# Patient Record
Sex: FEMALE | Race: WHITE | NOT HISPANIC OR LATINO | Employment: FULL TIME | URBAN - METROPOLITAN AREA
[De-identification: names, ages, dates, MRNs, and addresses within clinical notes are randomized per-mention and may not be internally consistent; named-entity substitution may affect disease eponyms.]

---

## 2017-07-07 ENCOUNTER — APPOINTMENT (OUTPATIENT)
Dept: RADIOLOGY | Facility: IMAGING CENTER | Age: 72
End: 2017-07-07
Attending: NURSE PRACTITIONER
Payer: COMMERCIAL

## 2017-07-07 ENCOUNTER — OCCUPATIONAL MEDICINE (OUTPATIENT)
Dept: URGENT CARE | Facility: PHYSICIAN GROUP | Age: 72
End: 2017-07-07
Payer: COMMERCIAL

## 2017-07-07 ENCOUNTER — APPOINTMENT (OUTPATIENT)
Dept: URGENT CARE | Facility: PHYSICIAN GROUP | Age: 72
End: 2017-07-07
Payer: MEDICARE

## 2017-07-07 VITALS
OXYGEN SATURATION: 98 % | DIASTOLIC BLOOD PRESSURE: 86 MMHG | WEIGHT: 144.8 LBS | RESPIRATION RATE: 14 BRPM | BODY MASS INDEX: 28.28 KG/M2 | TEMPERATURE: 98.2 F | HEART RATE: 69 BPM | SYSTOLIC BLOOD PRESSURE: 152 MMHG

## 2017-07-07 DIAGNOSIS — S86.912A STRAIN OF LEFT KNEE, INITIAL ENCOUNTER: ICD-10-CM

## 2017-07-07 PROCEDURE — 73564 X-RAY EXAM KNEE 4 OR MORE: CPT | Mod: 26,LT,29 | Performed by: NURSE PRACTITIONER

## 2017-07-07 PROCEDURE — 99214 OFFICE O/P EST MOD 30 MIN: CPT | Mod: 29 | Performed by: NURSE PRACTITIONER

## 2017-07-07 ASSESSMENT — ENCOUNTER SYMPTOMS
CHILLS: 0
FOCAL WEAKNESS: 0
NAUSEA: 0
MYALGIAS: 0
NECK PAIN: 0
BACK PAIN: 0
FEVER: 0
TINGLING: 0
SENSORY CHANGE: 0
VOMITING: 0

## 2017-07-07 NOTE — Clinical Note
[facility]   [address] [place] [address]. [address] - [place] [address]  Phone: [phone] - Fax: [phone]        [facility] Progress Report and Disability Certification  Date of Service: 7/7/2017   No Show:  No  Date / Time of Next Visit: 7/11/2017@10:40 AM   Claim Information   Patient Name: Sandie Moreno  Claim Number:     Employer: [employer]  Date of Injury: 6/28/2017     Insurer / TPA: [employer]  ID / SSN:     Occupation: Terri  Diagnosis: The encounter diagnosis was Strain of left knee, initial encounter.    Medical Information   Related to Industrial Injury? Yes    Subjective Complaints:  DOI  6/28/17 pt states she was climbing a ladder at work with onset of left knee pain.  The pain is 8/10 with standing, walking and sitting. She can feel popping in her knee with certain movement.  She has been wrapping with an ace wrap and taking nsaids with no improvement. The patient denies any previous knee injuries or other current employment. Pt was seen at [facility] and was told it was arthritis. The pain became worse working last night the patient came here for re-evaluation.    Objective Findings: The patient is awake alert no acute distress. Her left knee is swollen. There is no erythema, no ecchymosis, no increased warmth. She has good flexion and extension of the knee. There is no increased pain to palpation. CSM distally was intact. There is no laxity of the ligaments.   Pre-Existing Condition(s): Patient denies any previous knee injuries   Assessment:   Initial Visit    Status: Additional Care Required  Permanent Disability:No    Plan: Medication    Diagnostics: X-ray  Comments:negative knee x-ray    Comments:  Rest, ice, Ace wrap, Advil, follow-up with occupational medicine    Disability Information   Status: Released to Restricted Duty    From:  7/7/2017  Through: 7/12/2017 Restrictions are: Temporary  Comments:follow up with  occupational medicine   Physical Restrictions   Sitting:  < or = to 1 hr/day Standing:  < or = to 4 hrs/day Stooping:  < or = to 4 hrs/day Bending:      Squattin hrs/day Walking:  < or = to 4 hrs/day Climbing:  < or = to 4 hrs/day Pushing:      Pulling:    Other:    Reaching Above Shoulder (L):   Reaching Above Shoulder (R):       Reaching Below Shoulder (L):    Reaching Below Shoulder (R):      Not to exceed Weight Limits   Carrying(hrs):   Weight Limit(lb): < or = to 25 pounds Lifting(hrs):   Weight  Limit(lb): < or = to 25 pounds   Comments:      Repetitive Actions   Hands: i.e. Fine Manipulations from Grasping:     Feet: i.e. Operating Foot Controls:     Driving / Operate Machinery:     Physician Name: DIXIE Patricia Physician Signature: KYLAH Wilkinson e-Signature: Dr. Asa Stephen, Medical Director   Clinic Name / Location: [address]  [place] [address] Clinic Phone Number: Dept: [phone]   Appointment Time: 9:25 Am Visit Start Time: 9:37 AM   Check-In Time:  9:30 Am Visit Discharge Time: 11:42 AM   Original-Treating Physician or Chiropractor    Page 2-Insurer/TPA    Page 3-Employer    Page 4-Employee

## 2017-07-07 NOTE — Clinical Note
EMPLOYEE’S CLAIM FOR COMPENSATION/ REPORT OF INITIAL TREATMENT  FORM C-4    EMPLOYEE’S CLAIM - PROVIDE ALL INFORMATION REQUESTED   First Name  Sandei Last Name  Josh Birthdate                    1945                Sex  female Claim Number   Home Address  [address] Age  72 y.o. 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 [facility] [address]  [place]  Zip  [address]    Date of Injury  6/28/2017               Hour of Injury  11:00 PM Date Employer Notified  6/28/2017 Last Day of Work after Injury or Occupational Disease  7/4/2017 Supervisor to Whom Injury Reported  [facility]   Address or Location of Accident (if applicable)  [[address]]   What were you doing at the time of accident? (if applicable)  Climbing a ladder    How did this injury or occupational disease occur? (Be specific an answer in detail. Use additional sheet if necessary)  twisted knee   If you believe that you have an occupational disease, when did you first have knowledge of the disability and it relationship to your employment?  NA Witnesses to the Accident  None      Nature of Injury or Occupational Disease  Workers' Compensation  Part(s) of Body Injured or Affected  Knee (L), ,     I certify that the above is true and correct to the best of my knowledge and that I have provided this information in order to obtain the benefits of [place]  inclusive or Chapter [place] of [place]).  I hereby authorize any physician, chiropractor, surgeon, practitioner, or other person, any hospital, including [facility] or [facility] hospital, any medical service organization, any insurance company, or other institution or organization to release to each other, any medical or other information, including benefits paid or payable, pertinent to this injury or disease, except information relative to diagnosis, treatment and/or counseling for AIDS, psychological conditions, alcohol or controlled substances, for which I must give specific authorization.  A Photostat of this authorization shall be as valid as the original.     Date   Place   Employee’s Signature   THIS REPORT MUST BE COMPLETED AND MAILED WITHIN 3 WORKING DAYS OF TREATMENT   Place  [facility]  Name of Facility  [place]   Date  7/7/2017 Diagnosis  (S86.912A) Strain of left knee, initial encounter Is there evidence the injured employee was under the influence of alcohol and/or another controlled substance at the time of accident?   Hour  9:37 AM Description of Injury or Disease  The encounter diagnosis was Strain of left knee, initial encounter. No   Treatment  Rest, ice, ace wrap and advil, follow-up with occupational medicine.  Have you advised the patient to remain off work five days or more? No   X-Ray Findings  Negative   If Yes   From Date  To Date      From information given by the employee, together with medical evidence, can you directly connect this injury or occupational disease as job incurred?  Yes If No Full Duty  No Modified Duty  Yes   Is additional medical care by a physician indicated?  Yes If Modified Duty, Specify any Limitations / Restrictions  Sitting less than one hour day, standing, stooping, walking, climbing less than 4 hours a day. No lifting or carrying more than 25 pounds   Do you know of any previous injury or disease contributing to this condition or  "occupational disease?                            No   Date  7/7/2017 Print Doctor’s Name DIXIE Patricia I certify the employer’s copy of  this form was mailed on:   Address  [address] [place] [address]. [address] Insurer’s Use Only     [facility] Zip  [address]    Provider’s Tax ID Number  [id number]  Telephone  Dept: [phone]        e-SignMORRKYLAH ANDERSON   e-Signature: Dr. Asa Stephen, Medical Director Degree  APN        ORIGINAL-TREATING PHYSICIAN OR CHIROPRACTOR    PAGE 2-INSURER/TPA    PAGE 3-EMPLOYER    PAGE 4-EMPLOYEE             Form C-4 (rev10/07)              BRIEF DESCRIPTION OF RIGHTS AND BENEFITS  (Pursuant to [place]RS 616C.050)    Notice of Injury or Occupational Disease (Incident Report Form C-1): If an injury or occupational disease (OD) arises out of and in the  course of employment, you must provide written notice to your employer as soon as practicable, but no later than 7 days after the accident or  OD. Your employer shall maintain a sufficient supply of the required forms.    Claim for Compensation (Form C-4): If medical treatment is sought, the form C-4 is available at the place of initial treatment. A completed  \"Claim for Compensation\" (Form C-4) must be filed within 90 days after an accident or OD. The treating physician or chiropractor must,  within 3 working days after treatment, complete and mail to the employer, the employer's insurer and third-party , the Claim for  Compensation.    Medical Treatment: If you require medical treatment for your on-the-job injury or OD, you may be required to select a physician or  chiropractor from a list provided by your workers’ compensation insurer, if it has contracted with an Organization for Managed Care (MCO) or  Preferred Provider Organization (PPO) or providers of health care. If your employer has not entered into a contract with an MCO or PPO, you  may select a physician or chiropractor from the Panel of " Physicians and Chiropractors. Any medical costs related to your industrial injury or  OD will be paid by your insurer.    Temporary Total Disability (TTD): If your doctor has certified that you are unable to work for a period of at least 5 consecutive days, or 5  cumulative days in a 20-day period, or places restrictions on you that your employer does not accommodate, you may be entitled to TTD  compensation.    Temporary Partial Disability (TPD): If the wage you receive upon reemployment is less than the compensation for TTD to which you are  entitled, the insurer may be required to pay you TPD compensation to make up the difference. TPD can only be paid for a maximum of 24  months.    Permanent Partial Disability (PPD): When your medical condition is stable and there is an indication of a PPD as a result of your injury or  OD, within 30 days, your insurer must arrange for an evaluation by a rating physician or chiropractor to determine the degree of your PPD. The  amount of your PPD award depends on the date of injury, the results of the PPD evaluation and your age and wage.    Permanent Total Disability (PTD): If you are medically certified by a treating physician or chiropractor as permanently and totally disabled  and have been granted a PTD status by your insurer, you are entitled to receive monthly benefits not to exceed 66 2/3% of your average  monthly wage. The amount of your PTD payments is subject to reduction if you previously received a PPD award.    Vocational Rehabilitation Services: You may be eligible for vocational rehabilitation services if you are unable to return to the job due to a  permanent physical impairment or permanent restrictions as a result of your injury or occupational disease.    Transportation and Per [place] Reimbursement: You may be eligible for travel expenses and per chadwick associated with medical treatment.    Reopening: You may be able to reopen your claim if your condition  worsens after claim closure.    Appeal Process: If you disagree with a written determination issued by the insurer or the insurer does not respond to your request, you may  appeal to the Department of Administration, , by following the instructions contained in your determination letter. You must  appeal the determination within 70 days from the date of the determination letter at [address], [address], [place], [place]  [address], or [address], [place], [place] [address]. If you disagree with the  decision, you may appeal to the  Department of Administration, . You must file your appeal within 30 days from the date of the  decision  letter at [address], [address], [place], [place] [address], or [address] [facility] [address], [place], [place] [address]. If you  disagree with a decision of an , you may file a petition for judicial review with the District Court. You must do so within 30  days of the Appeal Officer’s decision. You may be represented by an  at your own expense or you may contact the [facility] for possible  representation.    [place]  for Injured Workers (NAIW): If you disagree with a  decision, you may request that NAIW represent you  without charge at an  Hearing. For information regarding denial of benefits, you may contact the [facility] at: [address], [place] [address], [phone], or [address], [place] [address], [phone]    To File a Complaint with the Division: If you wish to file a complaint with the  of the Division of Industrial Relations (DIR),  please contact the Workers’ Compensation Section, [address] [place], [facility] [facility] [address] [facility] [address], [place], [place] [address], telephone [phone], or  [address], [place] [address], telephone   [phone].    For assistance with Workers’ Compensation Issues: you may contact the Office of the Governor Consumer Health Assistance, [address] [place] [address], [address], [place] [address], Toll Free 1-935.633.8475, Web site: http://govcha.[facility].nv., E-mail  [email or web address].                                                                                                                                                                                                                                   __________________________________________________________________                                                                   _________________                Employee Name / Signature                                                                                                                                                       Date                                                                                                                                                                                                     D-2 (rev. 10/07)

## 2017-07-07 NOTE — MR AVS SNAPSHOT
Sandie Rodriguezil   2017 9:25 AM   Occupational Medicine   MRN: [id number]    Department:  [facility]   Dept Phone:  [phone]    Description:  Female : 1945   Provider:  DIXIE Patricia           Reason for Visit     Knee Pain (left)       Allergies as of 2017     Allergen Noted Reactions    Vicodin [Hydrocodone-Acetaminophen] 2010   Swelling    Antihistamine [Altaryl] 2009         You were diagnosed with     Strain of left knee, initial encounter   [[id number]]         Vital Signs     Blood Pressure Pulse Temperature Respirations Weight Oxygen Saturation    152/86 mmHg 69 36.8 °C (98.2 °F) 14 65.681 kg (144 lb 12.8 oz) 98%    Smoking Status                   Never Smoker            Basic Information     Date Of Birth Sex Race Ethnicity Preferred Language    1945 Female White Non- English      Your appointments     2017 10:40 AM   Workers Compensation (Long) with Juarez Tripathi M.D.   [address] [address]   [phone]              Problem List              ICD-10-CM Priority Class Noted - Resolved    Thyroid disease E07.9   Unknown - Present      Health Maintenance        Date Due Completion Dates    PAP SMEAR 1966 ---    IMM ZOSTER VACCINE 2005 ---    IMM PNEUMOCOCCAL 65+ (ADULT) LOW/MEDIUM RISK SERIES (1 of 2 - PCV13) 2010 ---    MAMMOGRAM 2016 (Declined), 2009, 2009, 2009    Override on 2015: Patient Declined    COLONOSCOPY 10/23/2016 10/23/2006 (Prv Comp)    Override on 10/23/2006: Previously completed    IMM INFLUENZA (1) 2017 ---    BONE DENSITY 2020    IMM DTaP/Tdap/Td Vaccine (3 - Td) 2024, 2009            Current Immunizations     Tdap Vaccine 2014  6:36 PM, 2009      Below and/or attached are the medications your provider expects you to take. Review all of your home  medications and newly ordered medications with your provider and/or pharmacist. Follow medication instructions as directed by your provider and/or pharmacist. Please keep your medication list with you and share with your provider. Update the information when medications are discontinued, doses are changed, or new medications (including over-the-counter products) are added; and carry medication information at all times in the event of emergency situations     Allergies:  VICODIN - Swelling     ANTIHISTAMINE - (reactions not documented)               Medications  Valid as of: July 07, 2017 - 12:16 PM    Generic Name Brand Name Tablet Size Instructions for use    Levothyroxine Sodium (Tab) SYNTHROID 137 MCG Take  by mouth every day.        Multiple Vitamin (Tab) Multiple Vitamins  Take  by mouth. daily         .                 Medicines prescribed today were sent to:     [id number] - [place] - [address]    [address] [place] [address]    Phone: [phone] Fax: [phone]    Open 24 Hours?: No      Medication refill instructions:       If your prescription bottle indicates you have medication refills left, it is not necessary to call your provider’s office. Please contact your pharmacy and they will refill your medication.    If your prescription bottle indicates you do not have any refills left, you may request refills at any time through one of the following ways: The online [employer] system (except Urgent Care), by calling your provider’s office, or by asking your pharmacy to contact your provider’s office with a refill request. Medication refills are processed only during regular business hours and may not be available until the next business day. Your provider may request additional information or to have a follow-up visit with you prior to refilling your medication.   *Please Note: Medication refills are assigned a new Rx number when refilled electronically. Your pharmacy may indicate that no  refills were authorized even though a new prescription for the same medication is available at the pharmacy. Please request the medicine by name with the pharmacy before contacting your provider for a refill.        Your To Do List     Future Labs/Procedures Complete By Expires    DX-KNEE COMPLETE 4+ LEFT  As directed 7/7/2018         S[employer] Access Code: [email or web address]  Expires: 8/6/2017 12:16 PM    [employer]  A secure, online tool to manage your health information     [employer]’s [employer] Nextel® is a secure, online tool that connects you to your personalized health information from the privacy of your home -- day or night - making it very easy for you to manage your healthcare. Once the activation process is completed, you can even access your medical information using the Sprint Nextel gus, which is available for free in the Apple Gus store or Google Play store.     Sprint Nextel provides the following levels of access (as shown below):   My Chart Features   [facility] Doctor [facility]  Specialists [facility]  Urgent  [facility]  Primary Care  Doctor   Email your healthcare team securely and privately 24/7 X X X    Manage appointments: schedule your next appointment; view details of past/upcoming appointments X      Request prescription refills. X      View recent personal medical records, including lab and immunizations X X X X   View health record, including health history, allergies, medications X X X X   Read reports about your outpatient visits, procedures, consult and ER notes X X X X   See your discharge summary, which is a recap of your hospital and/or ER visit that includes your diagnosis, lab results, and care plan. X X       How to register for [employer] Nextel:  1. Go to  [email or web address].  2. Click on the Sign Up Now box, which takes you to the New Member Sign Up page. You will need to provide the following information:  a. Enter your [employer] Nextel Access Code exactly as it appears at the top of this page. (You  will not need to use this code after you’ve completed the sign-up process. If you do not sign up before the expiration date, you must request a new code.)   b. Enter your date of birth.   c. Enter your home email address.   d. Click Submit, and follow the next screen’s instructions.  3. Create a [employer] ID. This will be your [employer] login ID and cannot be changed, so think of one that is secure and easy to remember.  4. Create a [employer] password. You can change your password at any time.  5. Enter your Password Reset Question and Answer. This can be used at a later time if you forget your password.   6. Enter your e-mail address. This allows you to receive e-mail notifications when new information is available in [employer].  7. Click Sign Up. You can now view your health information.    For assistance activating your [employer] account, call [phone]

## 2017-07-08 NOTE — PROGRESS NOTES
Subjective:      Sandie Moreno is a 72 y.o. female who presents with Knee Pain      DOI  6/28/17 pt states she was climbing a ladder at work with onset of left knee pain.  The pain is 8/10 with standing, walking and sitting. She can feel popping in her knee with certain movement.  She has been wrapping with an ace wrap and taking nsaids with no improvement. The patient denies any previous knee injuries or other current employment. Pt was seen at [facility] and was told it was arthritis. The pain became worse working last night the patient came here for re-evaluation.      HPI Comments: Medications, Allergies and Prior Medical Hx reviewed and updated in [facility].with patient/family today           Review of Systems   Constitutional: Negative for fever, chills and malaise/fatigue.   Gastrointestinal: Negative for nausea and vomiting.   Musculoskeletal: Positive for joint pain. Negative for myalgias, back pain and neck pain.   Skin: Negative for rash.   Neurological: Negative for tingling, sensory change and focal weakness.          Objective:     /86 mmHg  Pulse 69  Temp(Src) 36.8 °C (98.2 °F)  Resp 14  Wt 65.681 kg (144 lb 12.8 oz)  SpO2 98%     Physical Exam   Constitutional: She appears well-developed and well-nourished. No distress.   HENT:   Head: Normocephalic and atraumatic.   Eyes: Conjunctivae are normal. Pupils are equal, round, and reactive to light.   Neck: Neck supple.   Cardiovascular: Normal rate.    Pulmonary/Chest: Effort normal. No respiratory distress.   Neurological: She is alert.   Awake, alert, answering questions appropriately, moving all extremeties   Skin: Skin is warm and dry. No rash noted.   Psychiatric: She has a normal mood and affect. Her behavior is normal.   Vitals reviewed.      The patient is awake alert no acute distress. Her left knee is swollen. There is no erythema, no ecchymosis, no increased warmth. She has good flexion and extension of the knee. There is no increased  pain to palpation. CSM distally was intact. There is no laxity of the ligaments.       Assessment/Plan:       1. Strain of left knee, initial encounter  DX-KNEE COMPLETE 4+ LEFT       Xray of left knee -  Reviewed film and radiology interpretation:   Negative LEFT knee series.    Work restrictions as documented on D 39 form  Pt will go to the ER for worsening or changing symptoms as discussed, follow-up at [facility] Occupational Medicine  Discharge instructions discussed with pt/family who verbalize understanding and agreement with poc

## 2017-11-28 ENCOUNTER — OFFICE VISIT (OUTPATIENT)
Dept: URGENT CARE | Facility: PHYSICIAN GROUP | Age: 72
End: 2017-11-28
Payer: MEDICARE

## 2017-11-28 VITALS
HEART RATE: 85 BPM | BODY MASS INDEX: 28.47 KG/M2 | HEIGHT: 60 IN | OXYGEN SATURATION: 96 % | SYSTOLIC BLOOD PRESSURE: 132 MMHG | DIASTOLIC BLOOD PRESSURE: 60 MMHG | WEIGHT: 145 LBS | TEMPERATURE: 97.8 F

## 2017-11-28 DIAGNOSIS — H61.22 IMPACTED CERUMEN OF LEFT EAR: ICD-10-CM

## 2017-11-28 PROCEDURE — 99213 OFFICE O/P EST LOW 20 MIN: CPT | Performed by: PHYSICIAN ASSISTANT

## 2017-11-28 NOTE — PROGRESS NOTES
Subjective:      Sandie Moreno is a 72 y.o. female who presents with Ear Fullness (Lt ear unable to hear - onset yesterday when pt cleaned out her ears )            HPI  Chief Complaint   Patient presents with   • Ear Fullness     Lt ear unable to hear - onset yesterday when pt cleaned out her ears        HPI:  Sandie Moreno is a 72 y.o. female who presents with left ear fullness since yesterday.  Was trying to clean her ears out with a Q-tip and had hearinling loss since then.  No pain.  Patient denies HA, SOB, chest pain, palpitations, fever, chills, or n/v/d.      Past Medical History:   Diagnosis Date   • Breast cancer (CMS-HCC)    • Thyroid disease hypothyroid       Past Surgical History:   Procedure Laterality Date   • NODE BIOPSY SENTINEL  4/13/2009    [facility]   • BREAST BIOPSY  4/13/2009    Per[facility] by [facility] CO[facility]   • TONSILLECTOMY AND ADENOIDECTOMY     • TUBAL COAGULATION LAPAROSCOPIC BILATERAL  bilateral tubal ligation       Family History   Problem Relation Age of Onset   • Lung Disease Father    • Cancer Father      lung     No pertinent family history.    Social History     Social History   • Marital status:      Spouse name: N/A   • Number of children: N/A   • Years of education: N/A     Occupational History   • Not on file.     Social History Main Topics   • Smoking status: Never Smoker   • Smokeless tobacco: Never Used   • Alcohol use No   • Drug use: No   • Sexual activity: Yes     Partners: Male     Other Topics Concern   • Not on file     Social History Narrative   • No narrative on file         Current Outpatient Prescriptions:   •  SYNTHROID, Take  by mouth every day., 11/28/2017  •  Multiple Vitamins, Take  by mouth. daily , 11/28/2017    Allergies   Allergen Reactions   • Vicodin [Hydrocodone-Acetaminophen] Swelling   • Antihistamine [Altaryl]        \  Review of Systems   Constitutional:  Negative.    HENT: Positive for ear discharge, ear pain and hearing loss.    Eyes: Negative.    Respiratory: Negative.    Cardiovascular: Negative.    Gastrointestinal: Negative.    Genitourinary: Negative.    Musculoskeletal: Negative.    Skin: Negative.    Neurological: Negative.    Endo/Heme/Allergies: Negative.    Psychiatric/Behavioral: Negative.           Objective:     /60   Pulse 85   Temp 36.6 °C (97.8 °F)   Ht 1.524 m (5')   Wt 65.8 kg (145 lb)   SpO2 96%   BMI 28.32 kg/m²      Physical Exam       Nursing note and Vitals Reviewed.    Constitutional:   Appropriately groomed, pleasant affect, well nourished, in NAD.    Head:   Normocephalic, atraumatic.    Eyes:   PERRLA, EOM's full, sclera white, conjunctiva not erythematous, and medial canthus without exudate bilaterally.    Ears:  Auricle and tragus non-tender to manipulation.  No pre-auricular lymphadenopathy or mastoid ttp.  Left EAC with significant cerumen.  right TM visible and pearly gray with cone of light present and umbo and malleolus visible bilaterally.  Hearing grossly intact to voice.    Nose:  Nares patent bilaterally.  Nasal mucosa not bilaterally.    Throat:  Dentition wnl, mucosa moist without lesions.  Oropharynx mildly erythematous, with no enlargement of the palatine tonsils bilaterally with no exudates.    No post nasal drainage present.  Soft palate rises symmetrically bilaterally and uvula midline.      Neck: Neck supple, with mild anterior lymphadenopathy that is soft and mobile to palpation. Thyroid non-palpable without tenderness or nodules. No supraclavicular lymphadenopathy.    Lungs:  Respiratory effort not labored without accessory muscle use.     Heart:  RRR, without murmurs rubs or gallops.  Radial and dorsalis pedis pulse 2+ bilaterally.  No LE edema.    Musculoskeletal:  Gait non-antalgic with a narrow base.    Derm:  Skin without rashes or lesions with good turgor pressure.      Psychiatric:  Mood, affect,  and judgement appropriate.    Procedure: Cerumen Removal  Risks and benefits of procedure discussed  Cerumen removed with lavage after softening agent instilled  Patient tolerated well  Post procedure exam with clear canal and normal TM       Assessment/Plan:     Patient presents with left cerumen impaction. Please see procedure note for further details. Post-lavage EAC non-erythematous with no evidence of otitis media.    Patient was in agreement with this treatment plan and seemed to understand without barriers. All questions were encouraged and answered.  Reviewed signs and symptoms of when to seek emergency medical care.     Please note that this dictation was created using voice recognition software.  I have made every reasonable attempt to correct obvious errors, but I expect there are errors of zakia and possibly content that I did not discover before finalizing the note.

## 2017-11-29 ASSESSMENT — ENCOUNTER SYMPTOMS
EYES NEGATIVE: 1
RESPIRATORY NEGATIVE: 1
GASTROINTESTINAL NEGATIVE: 1
PSYCHIATRIC NEGATIVE: 1
NEUROLOGICAL NEGATIVE: 1
CARDIOVASCULAR NEGATIVE: 1
MUSCULOSKELETAL NEGATIVE: 1
CONSTITUTIONAL NEGATIVE: 1

## 2018-09-29 ENCOUNTER — OFFICE VISIT (OUTPATIENT)
Dept: URGENT CARE | Facility: PHYSICIAN GROUP | Age: 73
End: 2018-09-29
Payer: MEDICARE

## 2018-09-29 VITALS
RESPIRATION RATE: 15 BRPM | BODY MASS INDEX: 28.66 KG/M2 | SYSTOLIC BLOOD PRESSURE: 118 MMHG | WEIGHT: 146 LBS | TEMPERATURE: 98.4 F | HEART RATE: 73 BPM | DIASTOLIC BLOOD PRESSURE: 70 MMHG | HEIGHT: 60 IN | OXYGEN SATURATION: 98 %

## 2018-09-29 DIAGNOSIS — S60.552A FOREIGN BODY OF LEFT HAND, INITIAL ENCOUNTER: ICD-10-CM

## 2018-09-29 PROCEDURE — 99214 OFFICE O/P EST MOD 30 MIN: CPT | Performed by: PHYSICIAN ASSISTANT

## 2018-10-02 ASSESSMENT — ENCOUNTER SYMPTOMS
FOCAL WEAKNESS: 0
TINGLING: 0
SENSORY CHANGE: 0
NUMBNESS: 0

## 2018-10-02 NOTE — PROGRESS NOTES
"Subjective:      Sandie Moreno is a 73 y.o. female who presents with Wound Infection (left hand )    PMH:  has a past medical history of Breast cancer (HCC) and Thyroid disease (hypothyroid).  MEDS:   Current Outpatient Prescriptions:   •  SYNTHROID 137 MCG TABS, Take  by mouth every day., Disp: , Rfl:   •  MULTIPLE VITAMINS TABS, Take  by mouth. daily , Disp: , Rfl:   ALLERGIES:   Allergies   Allergen Reactions   • Vicodin [Hydrocodone-Acetaminophen] Swelling   • Antihistamine [Altaryl]      SURGHX:   Past Surgical History:   Procedure Laterality Date   • NODE BIOPSY SENTINEL  4/13/2009    [facility]   • BREAST BIOPSY  4/13/2009    [facility]   • TONSILLECTOMY AND ADENOIDECTOMY     • TUBAL COAGULATION LAPAROSCOPIC BILATERAL  bilateral tubal ligation     SOCHX:  reports that she has never smoked. She has never used smokeless tobacco. She reports that she does not drink alcohol or use drugs.  FH: family history includes Cancer in her father; Lung Disease in her father.          PT here for evaluation and removal of FB (splinter from a pallet) in the palm of her left hand x \"a few months\".  Pt states it is becoming more painful with pressure, thinks it may be trying to come out.  PT states tdap is up to date.        Foreign Body in Skin   This is a new problem. The current episode started more than 1 month ago. The problem occurs constantly. The problem has been gradually worsening. Pertinent negatives include no numbness. Exacerbated by: pressure. She has tried nothing for the symptoms. The treatment provided no relief.       Review of Systems   Neurological: Negative for tingling, sensory change, focal weakness and numbness.   All other systems reviewed and are negative.         Objective:     /70 (BP Location: Left arm, Patient Position: Sitting, BP Cuff Size: Adult)   Pulse 73   Temp 36.9 °C (98.4 °F) " (Temporal)   Resp 15   Ht 1.524 m (5')   Wt 66.2 kg (146 lb)   SpO2 98%   BMI 28.51 kg/m²      Physical Exam   Constitutional: She is oriented to person, place, and time. She appears well-developed and well-nourished. No distress.   HENT:   Head: Normocephalic and atraumatic.   Nose: Nose normal.   Eyes: Pupils are equal, round, and reactive to light. Conjunctivae and EOM are normal.   Neck: Normal range of motion. Neck supple.   Cardiovascular: Normal rate and intact distal pulses.    Pulmonary/Chest: Effort normal.   Musculoskeletal:        Left hand: She exhibits tenderness. She exhibits normal range of motion and no swelling. Normal sensation noted.        Hands:  Neurological: She is alert and oriented to person, place, and time.   Skin: Skin is warm and dry. Capillary refill takes less than 2 seconds.   Psychiatric: She has a normal mood and affect.   Nursing note and vitals reviewed.       Pt declined xray at this time.     Assessment/Plan:     1. Foreign body of left hand, initial encounter  REFERRAL TO HAND SURGERY     FB is easily palpable but is deep and in the palmar aspect of the hand over the 3rd/4th distal metacarpals.  I do not feel that UC is the appropriate setting for removal of this FB as it has been in the hand for some time.  I discussed this with the patient who understands why she will need to be referred to specialist.     PT should follow up with PCP in 1-2 days for re-evaluation if symptoms have not improved.  Discussed red flags and reasons to return to UC or ED.  Pt and/or family verbalized understanding of diagnosis and follow up instructions and was offered informational handout on diagnosis.  PT discharged.

## 2019-02-23 ENCOUNTER — OFFICE VISIT (OUTPATIENT)
Dept: URGENT CARE | Facility: PHYSICIAN GROUP | Age: 74
End: 2019-02-23
Payer: MEDICARE

## 2019-02-23 VITALS
HEART RATE: 77 BPM | RESPIRATION RATE: 16 BRPM | WEIGHT: 146 LBS | BODY MASS INDEX: 28.66 KG/M2 | HEIGHT: 60 IN | TEMPERATURE: 98.1 F | OXYGEN SATURATION: 98 % | DIASTOLIC BLOOD PRESSURE: 80 MMHG | SYSTOLIC BLOOD PRESSURE: 122 MMHG

## 2019-02-23 DIAGNOSIS — N89.8 VAGINAL LESION: ICD-10-CM

## 2019-02-23 DIAGNOSIS — B07.0 PLANTAR WART: ICD-10-CM

## 2019-02-23 PROCEDURE — 99214 OFFICE O/P EST MOD 30 MIN: CPT | Performed by: PHYSICIAN ASSISTANT

## 2019-02-23 ASSESSMENT — ENCOUNTER SYMPTOMS
NAUSEA: 0
FLANK PAIN: 0
ABDOMINAL PAIN: 0
CHILLS: 0
VOMITING: 0
DIARRHEA: 0
MYALGIAS: 0
SHORTNESS OF BREATH: 0
FEVER: 0

## 2019-02-23 NOTE — PROGRESS NOTES
"Subjective:   Sandie Moreno is a 74 y.o. female who presents for Warts (bottom of right foot ) and Genital Warts        Patient comes to clinic complaining of 2 issues both at a been present for months:    1.  Plantar wart.  She states on the bottom of her right foot she has had plantar warts for many months.  She has tried over-the-counter cryotherapy with moderate history of good resolution with this specific lesion currently she is not been able to clear up with over-the-counter cryotherapy and requests freezing in clinic today.  She denies any other areas of body with warts to skin.  Denies other treatments tried.    2.  Additionally she mentions a vaginal lesion.  She states she has had a full lesion to \"central\" vagina.  She states it feels full like a hemorrhoid.  She notes he can fill up further when she is forcefully bearing down for bowel movements.  She denies it being on either right or left labia.  She denies change over many months of lesion being present.  She denies urinary infection symptoms denying dysuria frequency urgency hematuria.  She denies vaginitis, denying abnormal discharge redness swelling or painful irritation of vagina itself.  She denies past medical history of similar.  She is tried no treatments.  She has no primary care currently.      Review of Systems   Constitutional: Negative for chills and fever.   Respiratory: Negative for shortness of breath.    Gastrointestinal: Negative for abdominal pain, diarrhea, nausea and vomiting.   Genitourinary: Negative for dysuria, flank pain, frequency, hematuria and urgency.        C/o vaginal bulge or fullness   Musculoskeletal: Negative for myalgias.   Skin: Positive for rash ( plantar wart to foot). Negative for itching.     Allergies   Allergen Reactions   • Vicodin [Hydrocodone-Acetaminophen] Swelling   • Antihistamine [Altaryl]       Objective:   /80   Pulse 77   Temp 36.7 °C (98.1 °F) (Temporal)   Resp 16   Ht 1.524 m (5')   " Wt 66.2 kg (146 lb)   SpO2 98%   BMI 28.51 kg/m²   Physical Exam   Constitutional: She is oriented to person, place, and time. She appears well-developed and well-nourished. No distress.   HENT:   Head: Normocephalic and atraumatic.   Right Ear: External ear normal.   Left Ear: External ear normal.   Nose: Nose normal.   Eyes: Conjunctivae and lids are normal. Right eye exhibits no discharge. Left eye exhibits no discharge. No scleral icterus.   Neck: Neck supple.   Pulmonary/Chest: Effort normal. No respiratory distress.   Genitourinary: Pelvic exam was performed with patient supine. No labial fusion. There is no rash, tenderness, lesion or injury on the right labia. There is no rash, tenderness, lesion or injury on the left labia. Cervix exhibits no motion tenderness, no discharge and no friability. No erythema, tenderness or bleeding in the vagina. No foreign body in the vagina. No signs of injury around the vagina. No vaginal discharge found.   Genitourinary Comments: Prolapsing cervix protruding from vagina, worsens w/ slight bearing down, no other FOB noted, no erythema/edema/fluctuance or other abnormalities noted   Musculoskeletal: Normal range of motion.        Feet:    Neurological: She is alert and oriented to person, place, and time. She is not disoriented.   Skin: Skin is warm and dry. She is not diaphoretic. No erythema. No pallor.   Psychiatric: Her speech is normal and behavior is normal.   Nursing note and vitals reviewed.    Two areas of plantar wart to the plantar aspect of foot treated with cryotherapy while in clinic today patient tolerated well      Assessment/Plan:   1. Plantar wart  - REFERRAL TO FAMILY PRACTICE    2. Vaginal lesion  - REFERRAL TO FAMILY PRACTICE  we review various techniques of wart treatment patient is given a printout and recommendation to trial duct tape therapy, follow-up with primary care-I did offer her a GYN referral which she declined-she prefers to follow-up with  primary care first we discussed potential options of surgical correction of vaginal sling versus pessary use    Return to clinic with lack of resolution or progression of symptoms.    Differential diagnosis, natural history, supportive care, and indications for immediate follow-up discussed.

## 2019-04-15 ENCOUNTER — OFFICE VISIT (OUTPATIENT)
Dept: MEDICAL GROUP | Facility: PHYSICIAN GROUP | Age: 74
End: 2019-04-15
Payer: MEDICARE

## 2019-04-15 VITALS
TEMPERATURE: 98 F | HEIGHT: 60 IN | HEART RATE: 72 BPM | BODY MASS INDEX: 27.88 KG/M2 | DIASTOLIC BLOOD PRESSURE: 68 MMHG | OXYGEN SATURATION: 99 % | WEIGHT: 142 LBS | SYSTOLIC BLOOD PRESSURE: 120 MMHG

## 2019-04-15 DIAGNOSIS — Z12.12 SCREENING FOR COLORECTAL CANCER: ICD-10-CM

## 2019-04-15 DIAGNOSIS — L57.0 AK (ACTINIC KERATOSIS): ICD-10-CM

## 2019-04-15 DIAGNOSIS — Z85.3 HISTORY OF BREAST CANCER: ICD-10-CM

## 2019-04-15 DIAGNOSIS — E03.9 ACQUIRED HYPOTHYROIDISM: ICD-10-CM

## 2019-04-15 DIAGNOSIS — Z12.11 SCREENING FOR COLORECTAL CANCER: ICD-10-CM

## 2019-04-15 DIAGNOSIS — Z00.00 PREVENTATIVE HEALTH CARE: ICD-10-CM

## 2019-04-15 DIAGNOSIS — M79.671 RIGHT FOOT PAIN: ICD-10-CM

## 2019-04-15 DIAGNOSIS — N81.2 CERVIX PROLAPSED INTO VAGINA: ICD-10-CM

## 2019-04-15 DIAGNOSIS — L84 CALLUS OF FOOT: ICD-10-CM

## 2019-04-15 PROBLEM — B07.0 PLANTAR WART: Status: ACTIVE | Noted: 2019-04-15

## 2019-04-15 PROCEDURE — 99214 OFFICE O/P EST MOD 30 MIN: CPT | Performed by: FAMILY MEDICINE

## 2019-04-15 ASSESSMENT — PATIENT HEALTH QUESTIONNAIRE - PHQ9: CLINICAL INTERPRETATION OF PHQ2 SCORE: 0

## 2019-04-15 NOTE — PROGRESS NOTES
CC: Skin rash    HISTORY OF THE PRESENT ILLNESS: Patient is a 74 y.o. female. This pleasant patient is here today to establish care and discuss health problems below.    Skin lesions, rash on face: Patient notes that she has multiple dry scaly spots on her face.  She does note a history of significant sun exposure.  She has no history of skin cancer.  Her spots on her face seem to occasionally scab over and get very dry and then heal but never entirely.    Hypothyroidism: Chronic issue for the patient.  She is on Synthroid 137 mcg daily.  She states this is managed by her oncologist Dr. Felix and that he checks her thyroid labs once a year.    Cervical prolapse: New issue for the patient.  She was seen in urgent care for this.  She notes that she feels a protrusion from her vagina, particularly when she is bearing down for any reason.  She has no incontinence or frequent UTIs.  At this time, she does not desire any intervention or surgical correction for this issue.    Calcific, possible plantar wart: Patient was seen in urgent care for plantar wart of the right foot.  She states the area of interest has been there for at least a year.  She is been trying to freeze it off for a long time now.  She is actually unsure if the plantar wart versus a large callus.  She notes that it is very painful in nature.  She is requesting referral to a podiatrist today.    History of breast cancer: Patient had breast cancer in 2009 and states she was treated with radiation only.  She states it was suggested to her that she have chemo as well but she states she would never get chemo.  She states she has not had any mammograms since she had her breast cancer.  I did offer mammogram screening today and she declined stating she wanted to talk to her oncologist about it first.    Allergies: Vicodin [hydrocodone-acetaminophen] and Antihistamine [altaryl]    Current Outpatient Prescriptions Ordered in [facility]   Medication Sig Dispense  Refill   • SYNTHROID 137 MCG TABS Take  by mouth every day.     • MULTIPLE VITAMINS TABS Take  by mouth. daily        No current Epic-ordered facility-administered medications on file.        Past Medical History:   Diagnosis Date   • Breast cancer (HCC)    • Thyroid disease hypothyroid       Past Surgical History:   Procedure Laterality Date   • NODE BIOPSY SENTINEL  4/13/2009    Performed by SHALINI COLE [facility]   • BREAST BIOPSY  4/13/2009    Performed by SHALINI COLE at [facility]   • TONSILLECTOMY AND ADENOIDECTOMY     • TUBAL COAGULATION LAPAROSCOPIC BILATERAL  bilateral tubal ligation       Social History   Substance Use Topics   • Smoking status: Never Smoker   • Smokeless tobacco: Never Used   • Alcohol use No       Social History     Social History Narrative   • No narrative on file       Family History   Problem Relation Age of Onset   • Lung Disease Father    • Cancer Father         lung       ROS:     - Constitutional: Negative for fever, chills, unexpected weight change, and fatigue/generalized weakness.     - HEENT: Negative for headaches, vision changes, hearing changes, ear pain, ear discharge, rhinorrhea, sinus congestion, sore throat, and neck pain.      - Respiratory: Negative for cough, sputum production, chest congestion, dyspnea, wheezing, and crackles.      - Cardiovascular: Negative for chest pain, palpitations, orthopnea, PND, and bilateral lower extremity edema.     - Gastrointestinal: Negative for heartburn, nausea, vomiting, abdominal pain, hematochezia, melena, diarrhea, constipation, and greasy/foul-smelling stools.     - Genitourinary: Negative for dysuria, polyuria, hematuria, pyuria, urinary urgency, and urinary incontinence.     Exam: /68 (BP Location: Right arm, Patient Position: Sitting, BP Cuff Size: Adult)   Pulse 72   Temp 36.7 °C (98 °F) (Temporal)   Ht 1.524 m (5')   Wt 64.4 kg (142 lb)   SpO2 99%  Body mass  index is 27.73 kg/m².    General: Well appearing, NAD  Pulmonary: Clear to ausculation.  Normal effort. No rales, ronchi, or wheezing.  Cardiovascular: Regular rate and rhythm without murmur, rubs or gallop.   Abdomen: Soft, nontender, nondistended. Normal bowel sounds. Liver and spleen are not palpable. No rebound or guarding  Neurologic:  normal gait  Lymph: No cervical, supraclavicular or axillary lymph nodes are palpable  Skin: Warm and dry.  Multiple erythematous scaly lesions consistent with actinic keratosis present on face.  Plantar surface of the forefoot on the right with large callus which is tender to palpation.  Musculoskeletal:  No extremity cyanosis, clubbing, or edema.  Psych: Normal mood and affect. Alert and oriented. Judgment and insight is normal.    Please note that this dictation was created using voice recognition software. I have made every reasonable attempt to correct obvious errors, but I expect that there are errors of grammar and possibly content that I did not discover before finalizing the note.      Assessment/Plan  Sandie was seen today for hypothyroidism.    Diagnoses and all orders for this visit:    History of breast cancer  Problem in remission for the patient.  She does continue to follow with oncology Dr. Felix yearly.  I did offer her screening mammogram today as she has not had one since her diagnosis and.  However the patient declined and stated she wanted to talk to her oncologist about it first.    Acquired hypothyroidism  Chronic well-controlled problem for the patient which is managed by her oncologist Dr. Felix.    Cervix prolapsed into vagina  Chronic issue for the patient.  She declines referral for surgical fixation or referral to gynecology at this time as it is otherwise not bothersome other than feeling the protrusion.    Screening for colorectal cancer  -     REFERRAL TO GI FOR COLONOSCOPY    Preventative health care  -     Comp Metabolic Panel; Future  -      HEMOGLOBIN A1C; Future  -     Lipid Profile; Future    Right foot pain  Callus of foot  Chronic uncontrolled problem for the patient.  Unclear whether large callus versus plantar wart.  In any case is causing the patient significant pain so we will go ahead and refer to podiatry.  -     REFERRAL TO PODIATRY    AK (actinic keratosis)  Chronic uncontrolled problem for the patient.  Given that she has numerous actinic keratoses present on her face, we will go ahead and refer to dermatology as she may benefit from topical medication for her actinic keratoses versus cryotherapy.  -     REFERRAL TO DERMATOLOGY    Follow-up in 6 months or sooner if needed.    Denisha Durham DO  [facility]

## 2019-06-12 ENCOUNTER — HOSPITAL ENCOUNTER (OUTPATIENT)
Dept: LAB | Facility: MEDICAL CENTER | Age: 74
End: 2019-06-12
Attending: INTERNAL MEDICINE
Payer: MEDICARE

## 2019-06-12 LAB
ALBUMIN SERPL BCP-MCNC: 4.5 G/DL (ref 3.2–4.9)
ALBUMIN/GLOB SERPL: 1.5 G/DL
ALP SERPL-CCNC: 66 U/L (ref 30–99)
ALT SERPL-CCNC: 19 U/L (ref 2–50)
ANION GAP SERPL CALC-SCNC: 9 MMOL/L (ref 0–11.9)
AST SERPL-CCNC: 23 U/L (ref 12–45)
BASOPHILS # BLD AUTO: 1.7 % (ref 0–1.8)
BASOPHILS # BLD: 0.12 K/UL (ref 0–0.12)
BILIRUB SERPL-MCNC: 0.5 MG/DL (ref 0.1–1.5)
BUN SERPL-MCNC: 28 MG/DL (ref 8–22)
CALCIUM SERPL-MCNC: 9.6 MG/DL (ref 8.5–10.5)
CEA SERPL-MCNC: 3.3 NG/ML (ref 0–3)
CHLORIDE SERPL-SCNC: 105 MMOL/L (ref 96–112)
CO2 SERPL-SCNC: 26 MMOL/L (ref 20–33)
CREAT SERPL-MCNC: 0.97 MG/DL (ref 0.5–1.4)
EOSINOPHIL # BLD AUTO: 0.23 K/UL (ref 0–0.51)
EOSINOPHIL NFR BLD: 3.3 % (ref 0–6.9)
ERYTHROCYTE [DISTWIDTH] IN BLOOD BY AUTOMATED COUNT: 46.1 FL (ref 35.9–50)
FASTING STATUS PATIENT QL REPORTED: NORMAL
GLOBULIN SER CALC-MCNC: 3 G/DL (ref 1.9–3.5)
GLUCOSE SERPL-MCNC: 93 MG/DL (ref 65–99)
HCT VFR BLD AUTO: 40.4 % (ref 37–47)
HGB BLD-MCNC: 13 G/DL (ref 12–16)
IMM GRANULOCYTES # BLD AUTO: 0.03 K/UL (ref 0–0.11)
IMM GRANULOCYTES NFR BLD AUTO: 0.4 % (ref 0–0.9)
LYMPHOCYTES # BLD AUTO: 1.41 K/UL (ref 1–4.8)
LYMPHOCYTES NFR BLD: 20 % (ref 22–41)
MCH RBC QN AUTO: 29.6 PG (ref 27–33)
MCHC RBC AUTO-ENTMCNC: 32.2 G/DL (ref 33.6–35)
MCV RBC AUTO: 92 FL (ref 81.4–97.8)
MONOCYTES # BLD AUTO: 0.75 K/UL (ref 0–0.85)
MONOCYTES NFR BLD AUTO: 10.7 % (ref 0–13.4)
NEUTROPHILS # BLD AUTO: 4.5 K/UL (ref 2–7.15)
NEUTROPHILS NFR BLD: 63.9 % (ref 44–72)
NRBC # BLD AUTO: 0 K/UL
NRBC BLD-RTO: 0 /100 WBC
PLATELET # BLD AUTO: 309 K/UL (ref 164–446)
PMV BLD AUTO: 9.3 FL (ref 9–12.9)
POTASSIUM SERPL-SCNC: 4.1 MMOL/L (ref 3.6–5.5)
PROT SERPL-MCNC: 7.5 G/DL (ref 6–8.2)
RBC # BLD AUTO: 4.39 M/UL (ref 4.2–5.4)
SODIUM SERPL-SCNC: 140 MMOL/L (ref 135–145)
T3 SERPL-MCNC: 91.7 NG/DL (ref 60–181)
T4 SERPL-MCNC: 9.6 UG/DL (ref 4–12)
TSH SERPL DL<=0.005 MIU/L-ACNC: 1.09 UIU/ML (ref 0.38–5.33)
WBC # BLD AUTO: 7 K/UL (ref 4.8–10.8)

## 2019-06-12 PROCEDURE — 86300 IMMUNOASSAY TUMOR CA 15-3: CPT

## 2019-06-12 PROCEDURE — 84480 ASSAY TRIIODOTHYRONINE (T3): CPT

## 2019-06-12 PROCEDURE — 36415 COLL VENOUS BLD VENIPUNCTURE: CPT

## 2019-06-12 PROCEDURE — 82378 CARCINOEMBRYONIC ANTIGEN: CPT

## 2019-06-12 PROCEDURE — 84443 ASSAY THYROID STIM HORMONE: CPT | Mod: GA

## 2019-06-12 PROCEDURE — 85025 COMPLETE CBC W/AUTO DIFF WBC: CPT

## 2019-06-12 PROCEDURE — 84436 ASSAY OF TOTAL THYROXINE: CPT | Mod: GA

## 2019-06-12 PROCEDURE — 80053 COMPREHEN METABOLIC PANEL: CPT

## 2019-06-14 LAB — CANCER AG27-29 SERPL-ACNC: 12 U/ML (ref 0–40)

## 2019-06-17 ENCOUNTER — APPOINTMENT (RX ONLY)
Dept: URBAN - METROPOLITAN AREA CLINIC 20 | Facility: CLINIC | Age: 74
Setting detail: DERMATOLOGY
End: 2019-06-17

## 2019-06-17 DIAGNOSIS — L57.8 OTHER SKIN CHANGES DUE TO CHRONIC EXPOSURE TO NONIONIZING RADIATION: ICD-10-CM

## 2019-06-17 DIAGNOSIS — L81.4 OTHER MELANIN HYPERPIGMENTATION: ICD-10-CM

## 2019-06-17 DIAGNOSIS — L71.8 OTHER ROSACEA: ICD-10-CM

## 2019-06-17 DIAGNOSIS — D22 MELANOCYTIC NEVI: ICD-10-CM

## 2019-06-17 DIAGNOSIS — D18.0 HEMANGIOMA: ICD-10-CM

## 2019-06-17 DIAGNOSIS — L57.0 ACTINIC KERATOSIS: ICD-10-CM

## 2019-06-17 DIAGNOSIS — L82.1 OTHER SEBORRHEIC KERATOSIS: ICD-10-CM

## 2019-06-17 PROBLEM — D22.5 MELANOCYTIC NEVI OF TRUNK: Status: ACTIVE | Noted: 2019-06-17

## 2019-06-17 PROBLEM — D18.01 HEMANGIOMA OF SKIN AND SUBCUTANEOUS TISSUE: Status: ACTIVE | Noted: 2019-06-17

## 2019-06-17 PROBLEM — Z85.3 PERSONAL HISTORY OF MALIGNANT NEOPLASM OF BREAST: Status: ACTIVE | Noted: 2019-06-17

## 2019-06-17 PROBLEM — E03.9 HYPOTHYROIDISM, UNSPECIFIED: Status: ACTIVE | Noted: 2019-06-17

## 2019-06-17 PROCEDURE — 17003 DESTRUCT PREMALG LES 2-14: CPT

## 2019-06-17 PROCEDURE — 17000 DESTRUCT PREMALG LESION: CPT

## 2019-06-17 PROCEDURE — 99203 OFFICE O/P NEW LOW 30 MIN: CPT | Mod: 25

## 2019-06-17 PROCEDURE — ? PRESCRIPTION

## 2019-06-17 PROCEDURE — ? COUNSELING

## 2019-06-17 PROCEDURE — ? LIQUID NITROGEN

## 2019-06-17 PROCEDURE — ? ADDITIONAL NOTES

## 2019-06-17 RX ORDER — METRONIDAZOLE 10 MG/G
GEL TOPICAL
Qty: 1 | Refills: 3 | Status: ERX | COMMUNITY
Start: 2019-06-17

## 2019-06-17 RX ADMIN — METRONIDAZOLE: 10 GEL TOPICAL at 16:03

## 2019-06-17 ASSESSMENT — LOCATION SIMPLE DESCRIPTION DERM
LOCATION SIMPLE: LEFT CHEEK
LOCATION SIMPLE: LEFT THIGH
LOCATION SIMPLE: RIGHT UPPER ARM
LOCATION SIMPLE: CHEST
LOCATION SIMPLE: RIGHT CHEEK
LOCATION SIMPLE: RIGHT ZYGOMA
LOCATION SIMPLE: RIGHT FOREARM
LOCATION SIMPLE: LEFT FOREARM
LOCATION SIMPLE: RIGHT THIGH
LOCATION SIMPLE: LEFT UPPER ARM
LOCATION SIMPLE: RIGHT UPPER BACK

## 2019-06-17 ASSESSMENT — LOCATION ZONE DERM
LOCATION ZONE: TRUNK
LOCATION ZONE: FACE
LOCATION ZONE: ARM
LOCATION ZONE: LEG

## 2019-06-17 ASSESSMENT — LOCATION DETAILED DESCRIPTION DERM
LOCATION DETAILED: LEFT PROXIMAL DORSAL FOREARM
LOCATION DETAILED: RIGHT ANTERIOR PROXIMAL UPPER ARM
LOCATION DETAILED: RIGHT INFERIOR UPPER BACK
LOCATION DETAILED: RIGHT CENTRAL ZYGOMA
LOCATION DETAILED: LEFT INFERIOR MEDIAL MALAR CHEEK
LOCATION DETAILED: RIGHT INFERIOR MEDIAL MALAR CHEEK
LOCATION DETAILED: LEFT ANTERIOR PROXIMAL UPPER ARM
LOCATION DETAILED: LEFT MEDIAL SUPERIOR CHEST
LOCATION DETAILED: RIGHT MID-UPPER BACK
LOCATION DETAILED: RIGHT PROXIMAL DORSAL FOREARM
LOCATION DETAILED: RIGHT CENTRAL MALAR CHEEK
LOCATION DETAILED: LEFT INFERIOR CENTRAL MALAR CHEEK
LOCATION DETAILED: RIGHT SUPERIOR MEDIAL UPPER BACK
LOCATION DETAILED: RIGHT ANTERIOR DISTAL THIGH
LOCATION DETAILED: LEFT MEDIAL MALAR CHEEK
LOCATION DETAILED: LEFT ANTERIOR DISTAL THIGH

## 2019-06-17 NOTE — PROCEDURE: ADDITIONAL NOTES
Additional Notes: May continue to use the retinol moisturizer (may worsen rosacea sometimes). \\nRecommended to use Metronidazole gel 0.1% every day.  \\nRecommended to return to clinic for a facial with Kindra or Erik, cards given.
Detail Level: Simple
Additional Notes: If it does not resolve in 2-3 months return to clinic for re-evaluation.

## 2019-11-12 ENCOUNTER — APPOINTMENT (OUTPATIENT)
Dept: URGENT CARE | Facility: PHYSICIAN GROUP | Age: 74
End: 2019-11-12
Payer: MEDICARE

## 2019-11-13 ENCOUNTER — OFFICE VISIT (OUTPATIENT)
Dept: URGENT CARE | Facility: PHYSICIAN GROUP | Age: 74
End: 2019-11-13
Payer: MEDICARE

## 2019-11-13 VITALS
DIASTOLIC BLOOD PRESSURE: 56 MMHG | HEIGHT: 60 IN | HEART RATE: 74 BPM | TEMPERATURE: 99.1 F | SYSTOLIC BLOOD PRESSURE: 110 MMHG | BODY MASS INDEX: 27.68 KG/M2 | WEIGHT: 141 LBS | RESPIRATION RATE: 20 BRPM | OXYGEN SATURATION: 98 %

## 2019-11-13 DIAGNOSIS — J32.9 SINUSITIS, UNSPECIFIED CHRONICITY, UNSPECIFIED LOCATION: ICD-10-CM

## 2019-11-13 PROCEDURE — 99213 OFFICE O/P EST LOW 20 MIN: CPT | Performed by: NURSE PRACTITIONER

## 2019-11-13 ASSESSMENT — ENCOUNTER SYMPTOMS
NECK PAIN: 0
DIZZINESS: 0
HEADACHES: 1
COUGH: 0
SORE THROAT: 0
CHILLS: 0
NAUSEA: 0
HEARTBURN: 0
SINUS PRESSURE: 1

## 2019-11-13 NOTE — PROGRESS NOTES
Subjective:      Sandie Moreno is a 74 y.o. female who presents with Sinus Problem (fatigue, pt would like a work note, onset last week)            Sinus Problem   This is a new problem. The current episode started in the past 7 days. The problem has been gradually worsening since onset. There has been no fever. Her pain is at a severity of 2/10. The pain is mild. Associated symptoms include congestion, headaches and sinus pressure. Pertinent negatives include no chills, coughing, neck pain or sore throat. (Patient states that she has had similar symptoms to this before does not feel it is in an infection and would not like antibiotics.  Just needs a work note) Treatments tried: Grapefruit seed and Neem. The treatment provided mild relief.       Review of Systems   Constitutional: Negative for chills.   HENT: Positive for congestion and sinus pressure. Negative for sore throat.    Respiratory: Negative for cough.    Cardiovascular: Negative for chest pain.   Gastrointestinal: Negative for heartburn and nausea.   Musculoskeletal: Negative for neck pain.   Neurological: Positive for headaches. Negative for dizziness.     Past Medical History:   Diagnosis Date   • Breast cancer (HCC)    • Thyroid disease hypothyroid      Past Surgical History:   Procedure Laterality Date   • NODE BIOPSY SENTINEL  4/13/2009    Performed by SHALINI COLE [facility]   • BREAST BIOPSY  4/13/2009    Performed by SHALINI COLE [facility]   • TONSILLECTOMY AND ADENOIDECTOMY     • TUBAL COAGULATION LAPAROSCOPIC BILATERAL  bilateral tubal ligation      Social History     Socioeconomic History   • Marital status:      Spouse name: Not on file   • Number of children: Not on file   • Years of education: Not on file   • Highest education level: Not on file   Occupational History   • Not on file   Social Needs   • Financial resource strain: Not on file   • Food insecurity:     Worry:  Not on file     Inability: Not on file   • Transportation needs:     Medical: Not on file     Non-medical: Not on file   Tobacco Use   • Smoking status: Never Smoker   • Smokeless tobacco: Never Used   Substance and Sexual Activity   • Alcohol use: No   • Drug use: No   • Sexual activity: Yes     Partners: Male   Lifestyle   • Physical activity:     Days per week: Not on file     Minutes per session: Not on file   • Stress: Not on file   Relationships   • Social connections:     Talks on phone: Not on file     Gets together: Not on file     Attends Synagogue service: Not on file     Active member of club or organization: Not on file     Attends meetings of clubs or organizations: Not on file     Relationship status: Not on file   • Intimate partner violence:     Fear of current or ex partner: Not on file     Emotionally abused: Not on file     Physically abused: Not on file     Forced sexual activity: Not on file   Other Topics Concern   • Not on file   Social History Narrative   • Not on file    Allergies: Vicodin [hydrocodone-acetaminophen] and Antihistamine [altaryl]           Objective:     /56 (BP Location: Left arm, Patient Position: Sitting, BP Cuff Size: Adult)   Pulse 74   Temp 37.3 °C (99.1 °F) (Temporal)   Resp 20   Ht 1.524 m (5')   Wt 64 kg (141 lb)   SpO2 98%   BMI 27.54 kg/m²      Physical Exam  Vitals signs reviewed.   Constitutional:       Appearance: Normal appearance.   HENT:      Right Ear: Tympanic membrane, ear canal and external ear normal.      Left Ear: Tympanic membrane, ear canal and external ear normal.      Nose: Nose normal.      Mouth/Throat:      Mouth: Mucous membranes are moist.   Cardiovascular:      Rate and Rhythm: Normal rate and regular rhythm.      Heart sounds: Normal heart sounds.   Pulmonary:      Effort: Pulmonary effort is normal.      Breath sounds: Normal breath sounds.   Skin:     General: Skin is warm.   Neurological:      Mental Status: She is alert and  oriented to person, place, and time.   Psychiatric:         Mood and Affect: Mood normal.         Behavior: Behavior normal.         Thought Content: Thought content normal.         Judgment: Judgment normal.                 Assessment/Plan:       1. Sinusitis, unspecified chronicity, unspecified location    Offer patient contingent antibiotic patient was resistant to treatment patient stated that she would take a holistic approach to treatment.  Wanted work note.    Suggested she increase fluids    Work note provided    Instructed patient to return to clinic for worsening symptoms or symptoms that persist for 7 to 10 days  Supportive care, differential diagnoses, and indications for immediate follow-up discussed with patient.    Pathogenesis of diagnosis discussed including typical length and natural progression. Patient expresses understanding and agrees to plan.       Please note that this dictation was created using voice recognition software. I have made every reasonable attempt to correct obvious errors, but I expect that there are errors of grammar and possibly content that I did not discover before finalizing the note.

## 2019-11-13 NOTE — LETTER
November 13, 2019         Patient: Sandie Moreno   YOB: 1945   Date of Visit: 11/13/2019           To Whom it May Concern:    Sandie Moreno was seen in my clinic on 11/13/2019. She may go back to work on 11/14/2019  If you have any questions or concerns, please don't hesitate to call.        Sincerely,           DAKOTA Gordon.  Electronically Signed

## 2021-01-11 DIAGNOSIS — Z23 NEED FOR VACCINATION: ICD-10-CM

## 2022-05-17 ENCOUNTER — OFFICE VISIT (OUTPATIENT)
Dept: URGENT CARE | Facility: PHYSICIAN GROUP | Age: 77
End: 2022-05-17
Payer: MEDICARE

## 2022-05-17 VITALS
OXYGEN SATURATION: 97 % | SYSTOLIC BLOOD PRESSURE: 114 MMHG | WEIGHT: 144.4 LBS | TEMPERATURE: 99 F | BODY MASS INDEX: 28.35 KG/M2 | DIASTOLIC BLOOD PRESSURE: 50 MMHG | HEART RATE: 82 BPM | RESPIRATION RATE: 16 BRPM | HEIGHT: 60 IN

## 2022-05-17 DIAGNOSIS — J02.9 SORE THROAT: ICD-10-CM

## 2022-05-17 DIAGNOSIS — R50.9 FEVER, UNSPECIFIED FEVER CAUSE: ICD-10-CM

## 2022-05-17 LAB
FLUAV+FLUBV AG SPEC QL IA: NEGATIVE
INT CON NEG: NORMAL
INT CON NEG: NORMAL
INT CON POS: NORMAL
INT CON POS: NORMAL
S PYO AG THROAT QL: NEGATIVE

## 2022-05-17 PROCEDURE — 87880 STREP A ASSAY W/OPTIC: CPT | Performed by: NURSE PRACTITIONER

## 2022-05-17 PROCEDURE — 87804 INFLUENZA ASSAY W/OPTIC: CPT | Performed by: NURSE PRACTITIONER

## 2022-05-17 PROCEDURE — 99213 OFFICE O/P EST LOW 20 MIN: CPT | Performed by: NURSE PRACTITIONER

## 2022-05-17 ASSESSMENT — ENCOUNTER SYMPTOMS
SHORTNESS OF BREATH: 0
SORE THROAT: 1
WHEEZING: 0
VOMITING: 0
EYE REDNESS: 0
NAUSEA: 0
CONSTIPATION: 0
CHILLS: 0
DIARRHEA: 0
MYALGIAS: 0
EYE DISCHARGE: 0
ABDOMINAL PAIN: 0
FEVER: 0
NECK PAIN: 0
DIZZINESS: 0
SINUS PAIN: 0
COUGH: 0
WEAKNESS: 0
HEADACHES: 0

## 2022-05-17 NOTE — LETTER
May 17, 2022       Patient: Sandie Moreno   YOB: 1945   Date of Visit: 5/17/2022         To Whom It May Concern:    In my medical opinion, I recommend that Sandie Moreno be excused from work today and tomorrow (5/18/2022) due to non-contagious illness at this time.    If you have any questions or concerns, please don't hesitate to call [phone]          Sincerely,          EMILY RuthRRuthN.  Electronically Signed

## 2022-05-17 NOTE — PROGRESS NOTES
Subjective     Sandie Moreno is a 77 y.o. female who presents with Sore Throat (Started yesterday )            HPI  States ST since yesterday. General fatigue, but has been cleaning out her 's old things which she finds tiring. Has used over the counter Ibuprofen, salt water gargle. Does get nasal congestion and runny nose year round. Chloraseptic spray helps.     PMH:  has a past medical history of Breast cancer (HCC) and Thyroid disease (hypothyroid).  MEDS:   Current Outpatient Medications:   •  SYNTHROID 137 MCG TABS, Take  by mouth every day., Disp: , Rfl:   •  MULTIPLE VITAMINS TABS, Take  by mouth. daily , Disp: , Rfl:   ALLERGIES:   Allergies   Allergen Reactions   • Vicodin [Hydrocodone-Acetaminophen] Swelling   • Antihistamine [Altaryl]      SURGHX:   Past Surgical History:   Procedure Laterality Date   • NODE BIOPSY SENTINEL  4/13/2009    [facility]   • BREAST BIOPSY  4/13/2009    Performed by SHALINI COLE at SURGERY SAME DAY [facility]   • TONSILLECTOMY AND ADENOIDECTOMY     • TUBAL COAGULATION LAPAROSCOPIC BILATERAL  bilateral tubal ligation     SOCHX:  reports that she has never smoked. She has never used smokeless tobacco. She reports that she does not drink alcohol and does not use drugs.  FH: Family history was reviewed, no pertinent findings to report    Review of Systems   Constitutional: Positive for malaise/fatigue. Negative for chills and fever.   HENT: Positive for congestion and sore throat. Negative for ear pain and sinus pain.    Eyes: Negative for discharge and redness.   Respiratory: Negative for cough, shortness of breath and wheezing.    Gastrointestinal: Negative for abdominal pain, constipation, diarrhea, nausea and vomiting.   Musculoskeletal: Negative for myalgias and neck pain.   Skin: Negative for itching and rash.   Neurological: Negative for dizziness, weakness and headaches.   Endo/Heme/Allergies: Positive  for environmental allergies.   All other systems reviewed and are negative.             Objective     /50 (BP Location: Right arm, Patient Position: Sitting, BP Cuff Size: Adult)   Pulse 82   Temp 37.2 °C (99 °F) (Temporal)   Resp 16   Ht 1.524 m (5')   Wt 65.5 kg (144 lb 6.4 oz)   SpO2 97%   BMI 28.20 kg/m²      Physical Exam  Vitals reviewed.   Constitutional:       General: She is awake. She is not in acute distress.     Appearance: Normal appearance. She is well-developed. She is not ill-appearing, toxic-appearing or diaphoretic.   HENT:      Head: Normocephalic.      Right Ear: Tympanic membrane, ear canal and external ear normal.      Left Ear: Tympanic membrane, ear canal and external ear normal.      Nose: Congestion and rhinorrhea present.      Mouth/Throat:      Lips: Pink.      Mouth: Mucous membranes are dry.      Pharynx: Uvula midline. Posterior oropharyngeal erythema present. No pharyngeal swelling, oropharyngeal exudate or uvula swelling.      Tonsils: 0 on the right. 0 on the left.   Eyes:      Conjunctiva/sclera: Conjunctivae normal.      Pupils: Pupils are equal, round, and reactive to light.   Cardiovascular:      Rate and Rhythm: Normal rate.   Pulmonary:      Effort: Pulmonary effort is normal. No tachypnea, accessory muscle usage or respiratory distress.   Musculoskeletal:         General: Normal range of motion.      Cervical back: Normal range of motion and neck supple.   Skin:     General: Skin is warm and dry.   Neurological:      Mental Status: She is alert and oriented to person, place, and time.   Psychiatric:         Attention and Perception: Attention normal.         Mood and Affect: Mood normal.         Speech: Speech normal.         Behavior: Behavior normal. Behavior is cooperative.                             Assessment & Plan        1. Fever, unspecified fever cause    - POCT Rapid Strep A  - POCT Influenza A/B    2. Sore throat    - POCT Rapid Strep A  - POCT  Influenza A/B      -Increase water intake  -May use over the counter Ibuprofen/Tylenol as needed for any fever, body aches or throat pain  -May take long acting antihistamine for seasonal allergy symptoms as needed  -May use over the counter saline nasal spray for nasal congestion as needed  -May use over the counter Nasacort/Flonase for nasal congestion as needed   -May use throat lozenges for throat discomfort as needed   -May use over the counter Chloraseptic throat spray as needed   -May gargle with salt water up to 4x/day as needed for throat discomfort (1 tsp salt dissolved in 1 cup warm water)  -May drink smoothies for nutrition if too painful to swallow solid foods  -Monitor for any sinus pain/pressure with sinus congestion with thick mucus production, sinus headache, cough, shortness of breath, fever- need re-evaluation

## 2023-01-29 ENCOUNTER — APPOINTMENT (OUTPATIENT)
Dept: RADIOLOGY | Facility: IMAGING CENTER | Age: 78
End: 2023-01-29
Attending: NURSE PRACTITIONER
Payer: MEDICARE

## 2023-01-29 ENCOUNTER — OFFICE VISIT (OUTPATIENT)
Dept: URGENT CARE | Facility: PHYSICIAN GROUP | Age: 78
End: 2023-01-29
Payer: MEDICARE

## 2023-01-29 VITALS
BODY MASS INDEX: 27.48 KG/M2 | HEIGHT: 60 IN | WEIGHT: 140 LBS | DIASTOLIC BLOOD PRESSURE: 60 MMHG | SYSTOLIC BLOOD PRESSURE: 128 MMHG | RESPIRATION RATE: 16 BRPM | TEMPERATURE: 99.1 F | OXYGEN SATURATION: 98 % | HEART RATE: 76 BPM

## 2023-01-29 DIAGNOSIS — M25.571 ACUTE RIGHT ANKLE PAIN: ICD-10-CM

## 2023-01-29 DIAGNOSIS — S82.891A CLOSED FRACTURE OF RIGHT ANKLE, INITIAL ENCOUNTER: ICD-10-CM

## 2023-01-29 PROCEDURE — 73610 X-RAY EXAM OF ANKLE: CPT | Mod: TC,RT | Performed by: NURSE PRACTITIONER

## 2023-01-29 PROCEDURE — 99213 OFFICE O/P EST LOW 20 MIN: CPT | Performed by: NURSE PRACTITIONER

## 2023-01-29 ASSESSMENT — VISUAL ACUITY: OU: 1

## 2023-01-29 ASSESSMENT — ENCOUNTER SYMPTOMS
CONSTITUTIONAL NEGATIVE: 1
INABILITY TO BEAR WEIGHT: 0
NEUROLOGICAL NEGATIVE: 1

## 2023-01-29 NOTE — PATIENT INSTRUCTIONS
X-ray of right ankle:    Details    Reading Physician Reading Date Result Priority   Vickey Sena M.D.  [phone] 1/29/2023 Urgent Care     Narrative & Impression     1/29/2023 11:57 AM     HISTORY/REASON FOR EXAM:  Pain/Deformity Following Trauma     TECHNIQUE/EXAM DESCRIPTION AND NUMBER OF VIEWS:  3 views of the RIGHT ankle.     COMPARISON: None     FINDINGS:  There is a displaced fracture of the lateral malleolus. There is lateral soft tissue swelling. The distal tibia appears intact. The talus appears intact.     IMPRESSION:     Displaced fracture of the lateral malleolus.           Exam Ended: 01/29/23 12:11 PM Last Resulted: 01/29/23 12:17 PM

## 2023-01-29 NOTE — PROGRESS NOTES
Subjective:     Sandie Moreno is a 77 y.o. female who presents for Ankle Injury (Pt fell on ice injuring right ankle )       Ankle Injury   The injury mechanism was a twisting injury. Pertinent negatives include no inability to bear weight. The symptoms are aggravated by palpation and movement.     Slipped on ice with right foot while feeding her chickens. Twisted he right ankle. Has pain, tenderness, swelling at the right lateral ankle. Describes rotating her right ankle inwards. Tx: Ace wrap, ibuprofen. Using walker to help with ambulation.    Review of Systems   Constitutional: Negative.    Musculoskeletal:         R ankle injury, pain, swelling, tenderness   Neurological: Negative.    All other systems reviewed and are negative.    Refer to HPI for additional details.    During this visit, appropriate PPE was worn, hand hygiene was performed, and the patient and any visitors were masked.    PMH:  has a past medical history of Breast cancer (HCC) and Thyroid disease (hypothyroid).    MEDS:   Current Outpatient Medications:     SYNTHROID 137 MCG TABS, Take  by mouth every day., Disp: , Rfl:     MULTIPLE VITAMINS TABS, Take  by mouth. daily , Disp: , Rfl:     ALLERGIES:   Allergies   Allergen Reactions    Vicodin [Hydrocodone-Acetaminophen] Swelling    Antihistamine [Altaryl]      SURGHX:   Past Surgical History:   Procedure Laterality Date    NODE BIOPSY SENTINEL  4/13/2009    Performed by SHALINI COLE at SURGERY [facility] DAY [facility]    BREAST BIOPSY  4/13/2009    Performed by SHALINI COLE at SURGERY SAME DAY [facility]    TONSILLECTOMY AND ADENOIDECTOMY      TUBAL COAGULATION LAPAROSCOPIC BILATERAL  bilateral tubal ligation     SOCHX:  reports that she has never smoked. She has never used smokeless tobacco. She reports that she does not drink alcohol and does not use drugs.    FH: Per HPI as applicable/pertinent.      Objective:     /60 (BP Location: Left arm, Patient Position: Sitting,  BP Cuff Size: Adult)   Pulse 76   Temp 37.3 °C (99.1 °F) (Temporal)   Resp 16   Ht 1.524 m (5')   Wt 63.5 kg (140 lb)   SpO2 98%   BMI 27.34 kg/m²     Physical Exam  Nursing note reviewed.   Constitutional:       General: She is not in acute distress.     Appearance: She is well-developed. She is not ill-appearing or toxic-appearing.   Eyes:      General: Vision grossly intact.   Cardiovascular:      Rate and Rhythm: Normal rate.      Pulses: Normal pulses.   Pulmonary:      Effort: Pulmonary effort is normal. No respiratory distress.   Musculoskeletal:         General: No deformity. Normal range of motion.      Right lower leg: No swelling, deformity, lacerations or tenderness.      Right ankle: Swelling (Lateral) and ecchymosis (Mild, lateral) present. No deformity or lacerations. Tenderness present over the lateral malleolus. No medial malleolus tenderness. Normal range of motion. Anterior drawer test negative.      Right Achilles Tendon: No tenderness.      Right foot: Normal range of motion and normal capillary refill. No swelling, deformity, laceration or tenderness. Normal pulse.   Skin:     General: Skin is warm and dry.      Capillary Refill: Capillary refill takes less than 2 seconds.      Coloration: Skin is not pale.      Findings: No erythema or rash.   Neurological:      Mental Status: She is alert and oriented to person, place, and time.      Motor: No weakness.   Psychiatric:         Behavior: Behavior normal. Behavior is cooperative.     X-ray of right ankle:    Details    Reading Physician Reading Date Result Priority   Vickey Sena M.D.  [phone] 1/29/2023 Urgent Care     Narrative & Impression     1/29/2023 11:57 AM     HISTORY/REASON FOR EXAM:  Pain/Deformity Following Trauma     TECHNIQUE/EXAM DESCRIPTION AND NUMBER OF VIEWS:  3 views of the RIGHT ankle.     COMPARISON: None     FINDINGS:  There is a displaced fracture of the lateral malleolus. There is lateral soft tissue  swelling. The distal tibia appears intact. The talus appears intact.     IMPRESSION:     Displaced fracture of the lateral malleolus.           Exam Ended: 01/29/23 12:11 PM Last Resulted: 01/29/23 12:17 PM           Radiology report and images reviewed by myself. Concur with findings.      Assessment/Plan:     1. Acute right ankle pain  - DX-ANKLE 3+ VIEWS RIGHT; Future  - Referral to Orthopedics    2. Closed fracture of right ankle, initial encounter  - Referral to Orthopedics    Rest, ice, compression, and elevation (RICE) and over-the-counter acetaminophen alternating with ibuprofen, per 's instructions, as needed for pain. Tall CAM boot applied. Patient will ambulate as tolerated with her walker. She is referred to orthopedics for further evaluation and treatment. Work note provided.    Differential diagnosis, natural history, supportive care, over-the-counter symptom management per 's instructions, close monitoring, and indications for immediate follow-up discussed.     Warning signs reviewed. Strict return/ER precautions advised.     All questions answered. Patient agrees with the plan of care.    Discharge summary provided.

## 2023-01-29 NOTE — LETTER
January 29, 2023         Patient: Sandie Moreno   YOB: 1945   Date of Visit: 1/29/2023           To Whom it May Concern:    Sandie Moreno was seen in my clinic on 1/29/2023 due to illness. Due to medical necessity, please excuse patient from work for the next 1 week or as advised by orthopedics.       If you have any questions or concerns, please don't hesitate to call.        Sincerely,         DAKOTA Snider.  Electronically Signed

## 2023-01-31 PROBLEM — M79.672 PAIN OF LEFT FOOT: Status: ACTIVE | Noted: 2023-01-31

## 2023-02-01 PROBLEM — M79.671 RIGHT FOOT PAIN: Status: ACTIVE | Noted: 2023-01-31

## 2023-06-14 ENCOUNTER — TELEPHONE (OUTPATIENT)
Dept: HEALTH INFORMATION MANAGEMENT | Facility: OTHER | Age: 78
End: 2023-06-14

## 2023-11-30 ENCOUNTER — HOSPITAL ENCOUNTER (OUTPATIENT)
Dept: RADIOLOGY | Facility: MEDICAL CENTER | Age: 78
End: 2023-11-30
Attending: INTERNAL MEDICINE
Payer: MEDICARE

## 2023-11-30 DIAGNOSIS — M81.6 LOCALIZED OSTEOPOROSIS OF LEQUESNE: ICD-10-CM

## 2023-11-30 PROCEDURE — 77080 DXA BONE DENSITY AXIAL: CPT

## 2024-03-28 ENCOUNTER — OFFICE VISIT (OUTPATIENT)
Dept: URGENT CARE | Facility: PHYSICIAN GROUP | Age: 79
End: 2024-03-28
Payer: MEDICARE

## 2024-03-28 VITALS
BODY MASS INDEX: 27.48 KG/M2 | HEIGHT: 60 IN | DIASTOLIC BLOOD PRESSURE: 86 MMHG | HEART RATE: 84 BPM | SYSTOLIC BLOOD PRESSURE: 132 MMHG | WEIGHT: 140 LBS | OXYGEN SATURATION: 99 % | TEMPERATURE: 98.2 F | RESPIRATION RATE: 18 BRPM

## 2024-03-28 DIAGNOSIS — H61.22 IMPACTED CERUMEN OF LEFT EAR: ICD-10-CM

## 2024-03-28 PROCEDURE — 3075F SYST BP GE 130 - 139MM HG: CPT | Performed by: PHYSICIAN ASSISTANT

## 2024-03-28 PROCEDURE — 3079F DIAST BP 80-89 MM HG: CPT | Performed by: PHYSICIAN ASSISTANT

## 2024-03-28 PROCEDURE — 99213 OFFICE O/P EST LOW 20 MIN: CPT | Performed by: PHYSICIAN ASSISTANT

## 2024-03-28 ASSESSMENT — ENCOUNTER SYMPTOMS
EYE PAIN: 0
DIAPHORESIS: 0
WHEEZING: 0
SHORTNESS OF BREATH: 0
CHILLS: 0
EYE DISCHARGE: 0
EYE REDNESS: 0
CONSTIPATION: 0
ABDOMINAL PAIN: 0
FEVER: 0
COUGH: 0
SINUS PAIN: 0
VOMITING: 0
DIZZINESS: 0
DIARRHEA: 0
NAUSEA: 0
HEADACHES: 0
SORE THROAT: 0

## 2024-03-28 NOTE — PROGRESS NOTES
Subjective:     Sandie Moreno  is a 79 y.o. female who presents for Otalgia (Left )       She presents today with loss of hearing over the left ear.  She does have concern that may be related to impacted earwax that she has had did occur in the past.  She has tried various techniques at home to remove the max, without success.  No sinus congestion, no sore throat.  Denies fever/chills/sweats, chest pain, shortness of breath, nausea/vomiting, abdominal pain, diarrhea.       Review of Systems   Constitutional:  Negative for chills, diaphoresis, fever and malaise/fatigue.   HENT:  Positive for hearing loss. Negative for congestion, ear discharge, ear pain, sinus pain and sore throat.    Eyes:  Negative for pain, discharge and redness.   Respiratory:  Negative for cough, shortness of breath and wheezing.    Cardiovascular:  Negative for chest pain.   Gastrointestinal:  Negative for abdominal pain, constipation, diarrhea, nausea and vomiting.   Neurological:  Negative for dizziness and headaches.      Allergies   Allergen Reactions    Vicodin [Hydrocodone-Acetaminophen] Swelling    Antihistamine [Altaryl]      Past Medical History:   Diagnosis Date    Breast cancer (HCC)     Thyroid disease hypothyroid        Objective:   /86 (BP Location: Left arm, Patient Position: Sitting, BP Cuff Size: Adult)   Pulse 84   Temp 36.8 °C (98.2 °F) (Temporal)   Resp 18   Ht 1.524 m (5')   Wt 63.5 kg (140 lb)   SpO2 99%   BMI 27.34 kg/m²   Physical Exam  Vitals and nursing note reviewed.   Constitutional:       General: She is not in acute distress.     Appearance: She is not ill-appearing or toxic-appearing.   HENT:      Head: Normocephalic.      Right Ear: Tympanic membrane, ear canal and external ear normal. There is no impacted cerumen.      Left Ear: There is impacted cerumen.      Nose: No rhinorrhea.   Eyes:      General: No scleral icterus.     Conjunctiva/sclera: Conjunctivae normal.   Pulmonary:      Effort:  Pulmonary effort is normal. No respiratory distress.      Breath sounds: No stridor.   Musculoskeletal:      Cervical back: Neck supple.   Neurological:      Mental Status: She is alert and oriented to person, place, and time.   Psychiatric:         Mood and Affect: Mood normal.         Behavior: Behavior normal.         Thought Content: Thought content normal.         Judgment: Judgment normal.             Diagnostic testing: None    Assessment/Plan:     Encounter Diagnoses   Name Primary?    Impacted cerumen of left ear           Plan for care for today's complaint includes performing left ear lavage in office today, full earwax removal was obtained.  Patient had improvements in her hearing symptoms.  No evidence of otitis on exam today.  At home earwax techniques were discussed.  Continue to monitor symptoms and return to urgent care or follow-up with primary care provider if symptoms remain ongoing.  Follow-up in the emergency department if symptoms become severe, ER precautions discussed in office today..    See AVS Instructions below for written guidance provided to patient on after-visit management and care in addition to our verbal discussion during the visit.    Please note that this dictation was created using voice recognition software. I have attempted to correct all errors, but there may be sound-alike, spelling, grammar and possibly content errors that I did not discover before finalizing the note.    [email or web address] Zurita PA-C

## 2024-06-27 ENCOUNTER — APPOINTMENT (OUTPATIENT)
Dept: RADIOLOGY | Facility: IMAGING CENTER | Age: 79
End: 2024-06-27
Attending: PHYSICIAN ASSISTANT
Payer: MEDICARE

## 2024-06-27 ENCOUNTER — OFFICE VISIT (OUTPATIENT)
Dept: URGENT CARE | Facility: PHYSICIAN GROUP | Age: 79
End: 2024-06-27
Payer: MEDICARE

## 2024-06-27 VITALS
BODY MASS INDEX: 27.29 KG/M2 | OXYGEN SATURATION: 99 % | SYSTOLIC BLOOD PRESSURE: 128 MMHG | DIASTOLIC BLOOD PRESSURE: 82 MMHG | RESPIRATION RATE: 18 BRPM | TEMPERATURE: 98.5 F | HEIGHT: 60 IN | HEART RATE: 72 BPM | WEIGHT: 139 LBS

## 2024-06-27 DIAGNOSIS — L84 FOOT CALLUS: ICD-10-CM

## 2024-06-27 DIAGNOSIS — M79.671 RIGHT FOOT PAIN: Primary | ICD-10-CM

## 2024-06-27 DIAGNOSIS — M79.671 RIGHT FOOT PAIN: ICD-10-CM

## 2024-06-27 PROCEDURE — 3079F DIAST BP 80-89 MM HG: CPT | Performed by: PHYSICIAN ASSISTANT

## 2024-06-27 PROCEDURE — 3074F SYST BP LT 130 MM HG: CPT | Performed by: PHYSICIAN ASSISTANT

## 2024-06-27 PROCEDURE — 99214 OFFICE O/P EST MOD 30 MIN: CPT | Performed by: PHYSICIAN ASSISTANT

## 2024-06-27 NOTE — PROGRESS NOTES
Subjective:   Sandie Moreno is a 79 y.o. female who presents for Foot Pain (X 3 months )      HPI  The patient presents to the Urgent Care with complaints of right foot pain for 3 months.  Pain has been constant.  Location to the bottom of her foot distally.  Pain is worse with standing and weightbearing.  She does have a small callus to her foot but she has tried cutting out the sole of her shoe for pressure relief which did not help.  The pain is next to the callus.  Denies any known injury or trauma.  She does walk on her feet all day.  She is very active.  She wears comfortable athletic running shoes.  Denies any known history of arthritis.  Denies any worsening pain but no improving pain.        Past Medical History:   Diagnosis Date    Breast cancer (HCC)     Thyroid disease hypothyroid     Allergies   Allergen Reactions    Vicodin [Hydrocodone-Acetaminophen] Swelling    Antihistamine [Altaryl]         Objective:     /82 (BP Location: Left arm, Patient Position: Sitting, BP Cuff Size: Adult)   Pulse 72   Temp 36.9 °C (98.5 °F) (Temporal)   Resp 18   Ht 1.524 m (5')   Wt 63 kg (139 lb)   SpO2 99%   BMI 27.15 kg/m²     Physical Exam  Vitals reviewed.   Constitutional:       General: She is not in acute distress.     Appearance: Normal appearance. She is not ill-appearing or toxic-appearing.   Eyes:      Conjunctiva/sclera: Conjunctivae normal.   Cardiovascular:      Rate and Rhythm: Normal rate.   Pulmonary:      Effort: Pulmonary effort is normal.   Musculoskeletal:        Feet:       Comments: Right foot: Full ROM.  Localized tenderness to the mid to lateral distal plantar foot.  There is a callus to the mid plantar distal foot.  Skin intact.  Negative erythema, edema, crepitus, or deformity.  Negative tenderness to toe joints.  Neurovascularly intact distally.   Skin:     General: Skin is warm and dry.   Neurological:      General: No focal deficit present.      Mental Status: She is alert.    Psychiatric:         Mood and Affect: Mood normal.         Behavior: Behavior normal.         RADIOLOGY RESULTS   DX-FOOT-COMPLETE 3+ RIGHT    Result Date: 6/27/2024 6/27/2024 12:22 PM HISTORY/REASON FOR EXAM:  Atraumatic Pain/Swelling/Deformity; pain location to distal plantar foot TECHNIQUE/EXAM DESCRIPTION AND NUMBER OF VIEWS: 3 views of the RIGHT foot. COMPARISON:  None. FINDINGS:  No acute fracture is noted. There is no dislocation.  No bone erosion is noted. Partially visualized distal fibular hardware with no obvious complication. Plantar calcaneal enthesophytes.     No acute fracture or dislocation is noted.       Diagnosis and associated orders:     1. Right foot pain  - DX-FOOT-COMPLETE 3+ RIGHT; Future  - diclofenac sodium (VOLTAREN) 1 % Gel; Apply 4 grams to affected area no more than four times per day  Dispense: 100 g; Refill: 0  - Referral to Podiatry       Comments/MDM:     X-ray results per radiologist interpretation above. I personally reviewed images and radiologist report  Likely pressure-point pain.  Could be because due to the callus next to the area of concern.  Possible tendinitis.   Recommend Voltaren gel, Tylenol, ibuprofen, stretches.  Comfortable wide toe box shoes.  May try Dr. Butcher's inserts.  Will refer to podiatry for further evaluation and treatment due to chronicity of this issue.       I personally reviewed prior external notes and test results pertinent to today's visit. Pathogenesis of diagnosis discussed including typical length and natural progression. Supportive care, natural history, differential diagnoses, and indications for immediate follow-up discussed. Patient expresses understanding and agrees to plan. Patient denies any other questions or concerns.     Follow-up with the primary care physician for recheck, reevaluation, and consideration of further management.    Please note that this dictation was created using voice recognition software. I have made a reasonable  attempt to correct obvious errors, but I expect that there are errors of grammar and possibly content that I did not discover before finalizing the note.    This note was electronically signed by Zachary Thornton PA-C

## 2025-03-06 ENCOUNTER — APPOINTMENT (OUTPATIENT)
Dept: LAB | Facility: MEDICAL CENTER | Age: 80
End: 2025-03-06
Payer: MEDICARE

## 2025-03-21 ENCOUNTER — HOSPITAL ENCOUNTER (OUTPATIENT)
Dept: LAB | Facility: MEDICAL CENTER | Age: 80
End: 2025-03-21
Attending: INTERNAL MEDICINE
Payer: MEDICARE

## 2025-03-21 LAB
25(OH)D3 SERPL-MCNC: 99 NG/ML (ref 30–100)
ALBUMIN SERPL BCP-MCNC: 4.4 G/DL (ref 3.2–4.9)
ALBUMIN/GLOB SERPL: 1.4 G/DL
ALP SERPL-CCNC: 74 U/L (ref 30–99)
ALT SERPL-CCNC: 25 U/L (ref 2–50)
ANION GAP SERPL CALC-SCNC: 12 MMOL/L (ref 7–16)
AST SERPL-CCNC: 32 U/L (ref 12–45)
BASOPHILS # BLD AUTO: 1.9 % (ref 0–1.8)
BASOPHILS # BLD: 0.09 K/UL (ref 0–0.12)
BILIRUB SERPL-MCNC: 0.6 MG/DL (ref 0.1–1.5)
BUN SERPL-MCNC: 22 MG/DL (ref 8–22)
CALCIUM ALBUM COR SERPL-MCNC: 9.1 MG/DL (ref 8.5–10.5)
CALCIUM SERPL-MCNC: 9.4 MG/DL (ref 8.5–10.5)
CEA SERPL-MCNC: 3.5 NG/ML (ref 0–3)
CHLORIDE SERPL-SCNC: 108 MMOL/L (ref 96–112)
CO2 SERPL-SCNC: 22 MMOL/L (ref 20–33)
CREAT SERPL-MCNC: 0.87 MG/DL (ref 0.5–1.4)
EOSINOPHIL # BLD AUTO: 0.09 K/UL (ref 0–0.51)
EOSINOPHIL NFR BLD: 1.9 % (ref 0–6.9)
ERYTHROCYTE [DISTWIDTH] IN BLOOD BY AUTOMATED COUNT: 47.7 FL (ref 35.9–50)
EST. AVERAGE GLUCOSE BLD GHB EST-MCNC: 108 MG/DL
GFR SERPLBLD CREATININE-BSD FMLA CKD-EPI: 67 ML/MIN/1.73 M 2
GLOBULIN SER CALC-MCNC: 3.2 G/DL (ref 1.9–3.5)
GLUCOSE SERPL-MCNC: 91 MG/DL (ref 65–99)
HBA1C MFR BLD: 5.4 % (ref 4–5.6)
HCT VFR BLD AUTO: 43.7 % (ref 37–47)
HGB BLD-MCNC: 14 G/DL (ref 12–16)
IMM GRANULOCYTES # BLD AUTO: 0.02 K/UL (ref 0–0.11)
IMM GRANULOCYTES NFR BLD AUTO: 0.4 % (ref 0–0.9)
LYMPHOCYTES # BLD AUTO: 0.84 K/UL (ref 1–4.8)
LYMPHOCYTES NFR BLD: 17.4 % (ref 22–41)
MCH RBC QN AUTO: 29.6 PG (ref 27–33)
MCHC RBC AUTO-ENTMCNC: 32 G/DL (ref 32.2–35.5)
MCV RBC AUTO: 92.4 FL (ref 81.4–97.8)
MONOCYTES # BLD AUTO: 0.49 K/UL (ref 0–0.85)
MONOCYTES NFR BLD AUTO: 10.1 % (ref 0–13.4)
NEUTROPHILS # BLD AUTO: 3.31 K/UL (ref 1.82–7.42)
NEUTROPHILS NFR BLD: 68.3 % (ref 44–72)
NRBC # BLD AUTO: 0 K/UL
NRBC BLD-RTO: 0 /100 WBC (ref 0–0.2)
PLATELET # BLD AUTO: 299 K/UL (ref 164–446)
PMV BLD AUTO: 9.4 FL (ref 9–12.9)
POTASSIUM SERPL-SCNC: 4.2 MMOL/L (ref 3.6–5.5)
PROT SERPL-MCNC: 7.6 G/DL (ref 6–8.2)
RBC # BLD AUTO: 4.73 M/UL (ref 4.2–5.4)
SODIUM SERPL-SCNC: 142 MMOL/L (ref 135–145)
T4 FREE SERPL-MCNC: 1.58 NG/DL (ref 0.93–1.7)
TSH SERPL-ACNC: 0.18 UIU/ML (ref 0.35–5.5)
WBC # BLD AUTO: 4.8 K/UL (ref 4.8–10.8)

## 2025-03-21 PROCEDURE — 86300 IMMUNOASSAY TUMOR CA 15-3: CPT

## 2025-03-21 PROCEDURE — 83036 HEMOGLOBIN GLYCOSYLATED A1C: CPT | Mod: GA

## 2025-03-21 PROCEDURE — 82378 CARCINOEMBRYONIC ANTIGEN: CPT

## 2025-03-21 PROCEDURE — 85025 COMPLETE CBC W/AUTO DIFF WBC: CPT

## 2025-03-21 PROCEDURE — 84439 ASSAY OF FREE THYROXINE: CPT

## 2025-03-21 PROCEDURE — 80053 COMPREHEN METABOLIC PANEL: CPT

## 2025-03-21 PROCEDURE — 36415 COLL VENOUS BLD VENIPUNCTURE: CPT

## 2025-03-21 PROCEDURE — 84443 ASSAY THYROID STIM HORMONE: CPT

## 2025-03-21 PROCEDURE — 82306 VITAMIN D 25 HYDROXY: CPT

## 2025-03-23 LAB — CANCER AG27-29 SERPL-ACNC: 19.3 U/ML

## 2025-05-01 ENCOUNTER — HOSPITAL ENCOUNTER (OUTPATIENT)
Dept: RADIOLOGY | Facility: MEDICAL CENTER | Age: 80
End: 2025-05-01
Attending: FAMILY MEDICINE
Payer: MEDICARE

## 2025-05-01 DIAGNOSIS — Z12.31 VISIT FOR SCREENING MAMMOGRAM: ICD-10-CM

## 2025-05-01 DIAGNOSIS — Z85.3 PERSONAL HISTORY OF MALIGNANT NEOPLASM OF BREAST: ICD-10-CM

## 2025-05-01 PROCEDURE — 77067 SCR MAMMO BI INCL CAD: CPT

## 2025-08-08 ENCOUNTER — APPOINTMENT (OUTPATIENT)
Dept: URBAN - METROPOLITAN AREA CLINIC 4 | Facility: CLINIC | Age: 80
Setting detail: DERMATOLOGY
End: 2025-08-08

## 2025-08-08 DIAGNOSIS — Z71.89 OTHER SPECIFIED COUNSELING: ICD-10-CM

## 2025-08-08 DIAGNOSIS — L81.4 OTHER MELANIN HYPERPIGMENTATION: ICD-10-CM

## 2025-08-08 DIAGNOSIS — L57.0 ACTINIC KERATOSIS: ICD-10-CM

## 2025-08-08 DIAGNOSIS — D485 NEOPLASM OF UNCERTAIN BEHAVIOR OF SKIN: ICD-10-CM

## 2025-08-08 DIAGNOSIS — L82.1 OTHER SEBORRHEIC KERATOSIS: ICD-10-CM

## 2025-08-08 PROBLEM — D48.5 NEOPLASM OF UNCERTAIN BEHAVIOR OF SKIN: Status: ACTIVE | Noted: 2025-08-08

## 2025-08-08 PROCEDURE — ? BIOPSY BY SHAVE METHOD

## 2025-08-08 PROCEDURE — ? COUNSELING

## 2025-08-08 PROCEDURE — ? MEDICATION COUNSELING

## 2025-08-08 PROCEDURE — ? SUNSCREEN RECOMMENDATIONS

## 2025-08-08 PROCEDURE — ? TREATMENT REGIMEN

## 2025-08-08 PROCEDURE — ? PRESCRIPTION

## 2025-08-08 PROCEDURE — ? ADDITIONAL NOTES

## 2025-08-08 RX ORDER — FLUOROURACIL 2 G/40G
CREAM TOPICAL
Qty: 40 | Refills: 0 | Status: ERX | COMMUNITY
Start: 2025-08-08

## 2025-08-08 RX ADMIN — FLUOROURACIL: 2 CREAM TOPICAL at 00:00

## 2025-08-08 ASSESSMENT — LOCATION SIMPLE DESCRIPTION DERM
LOCATION SIMPLE: LEFT FOREHEAD
LOCATION SIMPLE: CHIN
LOCATION SIMPLE: LEFT CHEEK
LOCATION SIMPLE: RIGHT CHEEK
LOCATION SIMPLE: RIGHT FOREHEAD
LOCATION SIMPLE: LEFT ANTERIOR NECK

## 2025-08-08 ASSESSMENT — LOCATION DETAILED DESCRIPTION DERM
LOCATION DETAILED: LEFT INFERIOR ANTERIOR NECK
LOCATION DETAILED: RIGHT CHIN
LOCATION DETAILED: LEFT INFERIOR FOREHEAD
LOCATION DETAILED: RIGHT INFERIOR CENTRAL MALAR CHEEK
LOCATION DETAILED: LEFT INFERIOR LATERAL NECK
LOCATION DETAILED: LEFT INFERIOR LATERAL FOREHEAD
LOCATION DETAILED: RIGHT MEDIAL FOREHEAD
LOCATION DETAILED: LEFT CLAVICULAR NECK
LOCATION DETAILED: LEFT CENTRAL MALAR CHEEK

## 2025-08-08 ASSESSMENT — LOCATION ZONE DERM
LOCATION ZONE: NECK
LOCATION ZONE: FACE